# Patient Record
Sex: FEMALE | Race: BLACK OR AFRICAN AMERICAN | NOT HISPANIC OR LATINO | Employment: OTHER | URBAN - METROPOLITAN AREA
[De-identification: names, ages, dates, MRNs, and addresses within clinical notes are randomized per-mention and may not be internally consistent; named-entity substitution may affect disease eponyms.]

---

## 2019-11-25 PROBLEM — C64.1 RENAL CELL CARCINOMA OF RIGHT KIDNEY: Status: ACTIVE | Noted: 2019-11-25

## 2020-06-12 PROBLEM — R16.0 LIVER MASS: Status: ACTIVE | Noted: 2020-06-12

## 2020-06-12 PROBLEM — K76.6 PORTAL HYPERTENSION: Status: ACTIVE | Noted: 2020-06-12

## 2020-06-12 PROBLEM — I82.890 THROMBOSIS OF OVARIAN VEIN: Status: ACTIVE | Noted: 2020-06-12

## 2020-06-13 PROBLEM — I10 ESSENTIAL HYPERTENSION: Status: ACTIVE | Noted: 2020-06-13

## 2020-06-13 PROBLEM — Z95.828 S/P TIPS (TRANSJUGULAR INTRAHEPATIC PORTOSYSTEMIC SHUNT): Status: ACTIVE | Noted: 2020-06-13

## 2020-06-13 PROBLEM — K76.6 PORTAL HYPERTENSION: Status: RESOLVED | Noted: 2020-06-12 | Resolved: 2020-06-13

## 2020-06-23 PROBLEM — R16.0 LIVER MASS, RIGHT LOBE: Status: ACTIVE | Noted: 2020-06-23

## 2020-07-06 ENCOUNTER — NURSE TRIAGE (OUTPATIENT)
Dept: ADMINISTRATIVE | Facility: CLINIC | Age: 60
End: 2020-07-06

## 2020-07-06 NOTE — TELEPHONE ENCOUNTER
Patient contacted on behalf of the Post Procedural Symptom Tracking Program. No answer. Second attempt to contact patient will occur tomorrow per post procedural protocol.     Reason for Disposition   Busy signal.  First attempt to contact caller.  Follow-up call scheduled within 15 minutes.    Additional Information   Negative: Caller is angry or rude (e.g., hangs up, verbally abusive, yelling)   Negative: Caller hangs up   Negative: Caller has already spoken with the PCP and has no further questions.   Negative: Caller has already spoken with another triager and has no further questions.   Negative: Caller has already spoken with another triager or PCP AND has further questions AND triager able to answer questions.    Protocols used: NO CONTACT OR DUPLICATE CONTACT CALL-A-

## 2020-07-07 NOTE — TELEPHONE ENCOUNTER
Patient scheduled to be contacted today on behalf of the Post Procedural Symptom Tracker. Patient seen in clinic today. Per protocol, no additional contact required at this time.    Reason for Disposition   Caller has already spoken with the PCP (or office), and has no further questions    Protocols used: NO CONTACT OR DUPLICATE CONTACT CALL-A-[place]

## 2020-07-11 PROBLEM — Z72.0 TOBACCO ABUSE: Status: ACTIVE | Noted: 2020-07-11

## 2020-07-11 PROBLEM — R63.0 LOSS OF APPETITE: Status: ACTIVE | Noted: 2020-07-11

## 2020-07-11 PROBLEM — K86.89 MASS OF PANCREAS: Status: ACTIVE | Noted: 2020-07-11

## 2020-07-11 PROBLEM — G89.3 CANCER ASSOCIATED PAIN: Status: ACTIVE | Noted: 2017-05-12

## 2020-08-19 PROBLEM — D64.9 ANEMIA: Status: ACTIVE | Noted: 2020-08-19

## 2020-08-19 PROBLEM — I10 BENIGN ESSENTIAL HYPERTENSION: Status: ACTIVE | Noted: 2020-08-19

## 2020-08-19 PROBLEM — K92.0 HEMATEMESIS: Status: ACTIVE | Noted: 2020-08-19

## 2020-08-19 PROBLEM — R55 SYNCOPE AND COLLAPSE: Status: ACTIVE | Noted: 2020-08-19

## 2020-08-19 PROBLEM — R94.31 T WAVE INVERSION IN EKG: Status: ACTIVE | Noted: 2020-08-19

## 2020-08-19 PROBLEM — R55 SYNCOPE: Status: ACTIVE | Noted: 2020-08-19

## 2020-08-19 PROBLEM — F11.90 CHRONIC, CONTINUOUS USE OF OPIOIDS: Status: ACTIVE | Noted: 2020-08-19

## 2020-08-19 PROBLEM — K92.0 HEMATEMESIS WITH NAUSEA: Status: ACTIVE | Noted: 2020-08-19

## 2020-08-19 PROBLEM — R11.2 NAUSEA & VOMITING: Status: ACTIVE | Noted: 2020-08-19

## 2020-08-19 PROBLEM — E55.9 VITAMIN D DEFICIENCY: Status: ACTIVE | Noted: 2020-08-19

## 2020-08-19 PROBLEM — R10.84 GENERALIZED ABDOMINAL PAIN: Status: ACTIVE | Noted: 2020-08-19

## 2020-08-19 PROBLEM — K74.69 OTHER CIRRHOSIS OF LIVER: Status: ACTIVE | Noted: 2020-08-19

## 2020-08-19 PROBLEM — D64.9 SYMPTOMATIC ANEMIA: Status: ACTIVE | Noted: 2020-08-19

## 2020-08-19 PROBLEM — R06.02 SOB (SHORTNESS OF BREATH): Status: ACTIVE | Noted: 2020-08-19

## 2020-08-19 PROBLEM — R94.31 PROLONGED Q-T INTERVAL ON ECG: Status: ACTIVE | Noted: 2020-08-19

## 2020-08-19 PROBLEM — C78.7 METASTASIS TO LIVER: Status: ACTIVE | Noted: 2020-08-19

## 2020-08-19 PROBLEM — E86.1 HYPOTENSION DUE TO HYPOVOLEMIA: Status: ACTIVE | Noted: 2020-08-19

## 2020-09-08 PROBLEM — D50.0 IRON DEFICIENCY ANEMIA DUE TO CHRONIC BLOOD LOSS: Status: ACTIVE | Noted: 2020-09-08

## 2020-09-14 PROBLEM — K92.0 HEMATEMESIS WITH NAUSEA: Status: RESOLVED | Noted: 2020-08-19 | Resolved: 2020-09-14

## 2020-09-14 PROBLEM — K92.0 COFFEE GROUND EMESIS: Status: RESOLVED | Noted: 2020-08-19 | Resolved: 2020-09-14

## 2020-09-14 PROBLEM — R55 SYNCOPE AND COLLAPSE: Status: RESOLVED | Noted: 2020-08-19 | Resolved: 2020-09-14

## 2020-09-14 PROBLEM — R06.02 SOB (SHORTNESS OF BREATH): Status: RESOLVED | Noted: 2020-08-19 | Resolved: 2020-09-14

## 2020-09-14 PROBLEM — E86.1 HYPOTENSION DUE TO HYPOVOLEMIA: Status: RESOLVED | Noted: 2020-08-19 | Resolved: 2020-09-14

## 2020-09-14 PROBLEM — R55 SYNCOPE: Status: RESOLVED | Noted: 2020-08-19 | Resolved: 2020-09-14

## 2020-12-01 PROBLEM — Z51.12 ENCOUNTER FOR ANTINEOPLASTIC IMMUNOTHERAPY: Status: ACTIVE | Noted: 2020-12-01

## 2020-12-14 PROBLEM — E55.9 VITAMIN D DEFICIENCY: Status: RESOLVED | Noted: 2020-08-19 | Resolved: 2020-12-14

## 2020-12-14 PROBLEM — R10.84 GENERALIZED ABDOMINAL PAIN: Status: RESOLVED | Noted: 2020-08-19 | Resolved: 2020-12-14

## 2020-12-14 PROBLEM — I10 BENIGN ESSENTIAL HYPERTENSION: Status: RESOLVED | Noted: 2020-08-19 | Resolved: 2020-12-14

## 2020-12-14 PROBLEM — R94.31 T WAVE INVERSION IN EKG: Status: RESOLVED | Noted: 2020-08-19 | Resolved: 2020-12-14

## 2020-12-14 PROBLEM — D64.9 SYMPTOMATIC ANEMIA: Status: RESOLVED | Noted: 2020-08-19 | Resolved: 2020-12-14

## 2020-12-14 PROBLEM — R16.0 LIVER MASS: Status: RESOLVED | Noted: 2020-06-12 | Resolved: 2020-12-14

## 2020-12-14 PROBLEM — Z72.0 TOBACCO ABUSE: Status: RESOLVED | Noted: 2020-07-11 | Resolved: 2020-12-14

## 2020-12-14 PROBLEM — D64.9 ANEMIA: Status: RESOLVED | Noted: 2020-08-19 | Resolved: 2020-12-14

## 2020-12-14 PROBLEM — K86.89 MASS OF PANCREAS: Status: RESOLVED | Noted: 2020-07-11 | Resolved: 2020-12-14

## 2020-12-14 PROBLEM — Z01.818 PRE-OP EVALUATION: Status: ACTIVE | Noted: 2020-12-14

## 2021-04-28 DIAGNOSIS — Z12.31 OTHER SCREENING MAMMOGRAM: ICD-10-CM

## 2021-06-07 PROBLEM — R63.0 LOSS OF APPETITE: Status: RESOLVED | Noted: 2020-07-11 | Resolved: 2021-06-07

## 2021-06-07 PROBLEM — I10 ESSENTIAL HYPERTENSION: Status: RESOLVED | Noted: 2020-06-13 | Resolved: 2021-06-07

## 2021-06-07 PROBLEM — R11.2 NAUSEA & VOMITING: Status: RESOLVED | Noted: 2020-08-19 | Resolved: 2021-06-07

## 2021-06-08 PROBLEM — H26.9 CATARACT: Status: ACTIVE | Noted: 2021-06-08

## 2021-07-10 PROBLEM — H20.00 ACUTE ANTERIOR UVEITIS OF BOTH EYES: Status: ACTIVE | Noted: 2021-07-10

## 2022-03-17 PROBLEM — N39.0 RECURRENT URINARY TRACT INFECTION: Status: ACTIVE | Noted: 2022-03-17

## 2022-10-20 PROBLEM — D50.9 IDA (IRON DEFICIENCY ANEMIA): Status: ACTIVE | Noted: 2020-09-08

## 2022-12-12 PROBLEM — R07.9 CHEST PAIN, UNSPECIFIED: Status: ACTIVE | Noted: 2022-12-12

## 2022-12-12 PROBLEM — I16.0 HYPERTENSIVE URGENCY: Status: ACTIVE | Noted: 2022-12-12

## 2022-12-12 PROBLEM — N30.01 ACUTE CYSTITIS WITH HEMATURIA: Status: ACTIVE | Noted: 2022-12-12

## 2022-12-13 PROBLEM — I24.9 ACS (ACUTE CORONARY SYNDROME): Status: ACTIVE | Noted: 2022-12-13

## 2022-12-15 PROBLEM — I16.0 HYPERTENSIVE URGENCY: Status: RESOLVED | Noted: 2022-12-12 | Resolved: 2022-12-15

## 2022-12-15 PROBLEM — I24.9 ACS (ACUTE CORONARY SYNDROME): Status: RESOLVED | Noted: 2022-12-13 | Resolved: 2022-12-15

## 2023-01-25 PROBLEM — R30.0 DYSURIA: Status: ACTIVE | Noted: 2023-01-25

## 2023-02-21 PROBLEM — N39.41 URGENCY INCONTINENCE: Status: ACTIVE | Noted: 2023-02-21

## 2023-02-21 PROBLEM — B37.9 YEAST INFECTION: Status: ACTIVE | Noted: 2023-02-21

## 2023-02-21 PROBLEM — N76.0 BACTERIAL VAGINOSIS: Status: ACTIVE | Noted: 2023-02-21

## 2023-02-21 PROBLEM — B96.89 BACTERIAL VAGINOSIS: Status: ACTIVE | Noted: 2023-02-21

## 2023-04-03 PROBLEM — N39.0 UTI (URINARY TRACT INFECTION): Status: RESOLVED | Noted: 2022-03-17 | Resolved: 2023-04-03

## 2024-03-06 PROBLEM — Z51.5 PALLIATIVE CARE BY SPECIALIST: Status: ACTIVE | Noted: 2024-03-06

## 2024-03-06 PROBLEM — C78.00 MALIGNANT NEOPLASM METASTATIC TO LUNG: Status: ACTIVE | Noted: 2024-03-06

## 2024-05-01 PROBLEM — R04.2 HEMOPTYSIS: Status: ACTIVE | Noted: 2024-05-01

## 2024-05-07 PROBLEM — F17.210 CIGARETTE NICOTINE DEPENDENCE WITHOUT COMPLICATION: Status: ACTIVE | Noted: 2024-05-07

## 2024-05-07 PROBLEM — R91.1 LUNG NODULE: Status: ACTIVE | Noted: 2024-05-07

## 2024-05-07 PROBLEM — Z91.148 NON COMPLIANCE W MEDICATION REGIMEN: Status: ACTIVE | Noted: 2024-05-07

## 2024-05-08 PROBLEM — R04.89 PULMONARY HEMORRHAGE: Status: ACTIVE | Noted: 2024-05-08

## 2024-05-09 PROBLEM — K62.5 BLOOD PER RECTUM: Status: ACTIVE | Noted: 2024-05-09

## 2024-05-10 PROBLEM — N17.9 AKI (ACUTE KIDNEY INJURY): Status: ACTIVE | Noted: 2024-05-10

## 2024-05-12 PROBLEM — R07.9 CHEST PAIN, UNSPECIFIED: Status: RESOLVED | Noted: 2022-12-12 | Resolved: 2024-05-12

## 2024-08-12 PROBLEM — K62.5 BLOOD PER RECTUM: Status: RESOLVED | Noted: 2024-05-09 | Resolved: 2024-08-12

## 2024-08-12 PROBLEM — N17.9 AKI (ACUTE KIDNEY INJURY): Status: RESOLVED | Noted: 2024-05-10 | Resolved: 2024-08-12

## 2024-09-10 ENCOUNTER — PATIENT OUTREACH (OUTPATIENT)
Dept: ADMINISTRATIVE | Facility: HOSPITAL | Age: 64
End: 2024-09-10

## 2025-04-14 PROBLEM — G93.89 BRAIN MASS: Status: ACTIVE | Noted: 2025-04-14

## 2025-04-14 PROBLEM — K74.69 OTHER CIRRHOSIS OF LIVER: Status: RESOLVED | Noted: 2020-08-19 | Resolved: 2025-04-14

## 2025-04-14 PROBLEM — C64.9 RENAL CELL CARCINOMA: Status: ACTIVE | Noted: 2019-11-25

## 2025-04-14 PROBLEM — R29.6 FREQUENT FALLS: Status: ACTIVE | Noted: 2025-04-14

## 2025-04-14 PROBLEM — R42 DIZZINESS: Status: ACTIVE | Noted: 2025-04-14
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
## 2025-04-14 PROBLEM — R53.1 WEAKNESS: Status: ACTIVE | Noted: 2025-04-14

## 2025-04-15 PROBLEM — R29.898 WEAKNESS OF LOWER EXTREMITY: Status: ACTIVE | Noted: 2025-04-15

## 2025-04-23 PROBLEM — C79.9 MULTIPLE LESIONS OF METASTATIC MALIGNANCY: Status: ACTIVE | Noted: 2025-04-23

## 2025-04-24 PROBLEM — E87.1 HYPONATREMIA: Status: ACTIVE | Noted: 2025-04-24

## 2025-04-24 PROBLEM — R73.9 HYPERGLYCEMIA: Status: ACTIVE | Noted: 2025-04-24

## 2025-04-24 PROBLEM — D72.828 NEUTROPHILIC LEUKOCYTOSIS: Status: ACTIVE | Noted: 2025-04-24

## 2025-04-29 ENCOUNTER — PATIENT OUTREACH (OUTPATIENT)
Dept: ADMINISTRATIVE | Facility: CLINIC | Age: 65
End: 2025-04-29